# Patient Record
Sex: FEMALE | URBAN - METROPOLITAN AREA
[De-identification: names, ages, dates, MRNs, and addresses within clinical notes are randomized per-mention and may not be internally consistent; named-entity substitution may affect disease eponyms.]

---

## 2024-08-15 ENCOUNTER — TELEPHONE (OUTPATIENT)
Dept: NURSING | Facility: CLINIC | Age: 39
End: 2024-08-15

## 2024-08-15 NOTE — TELEPHONE ENCOUNTER
Pre-Medication  Assessment:    Dorys Pickett    When was the first day of your last period? 24  last intercourse was   (18+3 on 8/15) Patient is sure of LMP date.    When was your positive pregnancy test? 24       Was the test more than 52 days ago (before 24)? YES - ULTRASOUND NEEDED    Were you using hormonal birth control, an IUD or emergency contraception when you got pregnant? No    Have you ever been diagnosed with an ectopic pregnancy? YES - ULTRASOUND NEEDED    Have you ever had a tubal ligation or sterilization procedure? No    Have you ever been diagnosed with pelvic inflammatory disease? No    Are you having one-sided pelvic pain? No    How long are your typical menstrual cycles /  How many days from the start of one period to the start of the next one?  My periods are very irregular     During the past 3 months, has the time between periods varied from your typical cycles by more than a few days? Yes, cycles have been irregular.  ULTRASOUND NEEDED    Was LMP more than 10 weeks (70 days) ago (before 24)? YES - ULTRASOUND NEEDED    Did your last period start earlier or later than you would have expected? No    Was your last period shorter than usual? No    Was the amount of bleeding/cramping in your last period normal for you? Yes    Have you had any other recent bleeding that was not normal for you? No      Have you ever been diagnosed with:    An allergy or intolerance to mifepristone or misoprostol?  No    Chronic renal failure?  No    Hemorrhagic disorders? No    Inherited porphyria? No    Have you used systemic corticosteroid therapy long term?  No    Are you currently on anticoagulation therapy (excluding aspirin)?  No    Based on the responses given by the patient, an ultrasound is indicated.    Patient reports contraindication(s) to medication termination of pregnancy.  Plan: reach out for resources. Explained to pt the process and that she would have to pay up front  as well as have a 2 day stay in the [facility] .  Pt plans to reach out to resources and call back if unable to get scheduled.    [facility]     [facility] (up to 23w6d by best dates) [phone]?     (Mary Villavicencio, : [phone])     [facility] (up to 18w Phone: [phone]     [facility] ([facility]) up to 17 weeks [phone]